# Patient Record
Sex: MALE | URBAN - METROPOLITAN AREA
[De-identification: names, ages, dates, MRNs, and addresses within clinical notes are randomized per-mention and may not be internally consistent; named-entity substitution may affect disease eponyms.]

---

## 2023-10-02 ENCOUNTER — APPOINTMENT (RX ONLY)
Dept: URBAN - METROPOLITAN AREA CLINIC 81 | Facility: CLINIC | Age: 48
Setting detail: DERMATOLOGY
End: 2023-10-02

## 2023-10-02 DIAGNOSIS — L91.8 OTHER HYPERTROPHIC DISORDERS OF THE SKIN: ICD-10-CM

## 2023-10-02 DIAGNOSIS — D18.0 HEMANGIOMA: ICD-10-CM

## 2023-10-02 DIAGNOSIS — L81.4 OTHER MELANIN HYPERPIGMENTATION: ICD-10-CM

## 2023-10-02 DIAGNOSIS — L259 CONTACT DERMATITIS AND OTHER ECZEMA, UNSPECIFIED CAUSE: ICD-10-CM

## 2023-10-02 DIAGNOSIS — L57.8 OTHER SKIN CHANGES DUE TO CHRONIC EXPOSURE TO NONIONIZING RADIATION: ICD-10-CM

## 2023-10-02 DIAGNOSIS — D22 MELANOCYTIC NEVI: ICD-10-CM

## 2023-10-02 DIAGNOSIS — Z71.89 OTHER SPECIFIED COUNSELING: ICD-10-CM

## 2023-10-02 DIAGNOSIS — L82.1 OTHER SEBORRHEIC KERATOSIS: ICD-10-CM

## 2023-10-02 PROBLEM — D23.72 OTHER BENIGN NEOPLASM OF SKIN OF LEFT LOWER LIMB, INCLUDING HIP: Status: ACTIVE | Noted: 2023-10-02

## 2023-10-02 PROBLEM — D18.01 HEMANGIOMA OF SKIN AND SUBCUTANEOUS TISSUE: Status: ACTIVE | Noted: 2023-10-02

## 2023-10-02 PROBLEM — D29.4 BENIGN NEOPLASM OF SCROTUM: Status: ACTIVE | Noted: 2023-10-02

## 2023-10-02 PROBLEM — L30.8 OTHER SPECIFIED DERMATITIS: Status: ACTIVE | Noted: 2023-10-02

## 2023-10-02 PROBLEM — D22.5 MELANOCYTIC NEVI OF TRUNK: Status: ACTIVE | Noted: 2023-10-02

## 2023-10-02 PROCEDURE — 99204 OFFICE O/P NEW MOD 45 MIN: CPT | Mod: 25

## 2023-10-02 PROCEDURE — ? PRESCRIPTION

## 2023-10-02 PROCEDURE — ? TREATMENT REGIMEN

## 2023-10-02 PROCEDURE — 17110 DESTRUCTION B9 LES UP TO 14: CPT

## 2023-10-02 PROCEDURE — ? COUNSELING

## 2023-10-02 PROCEDURE — ? FULL BODY SKIN EXAM

## 2023-10-02 PROCEDURE — ? BENIGN DESTRUCTION

## 2023-10-02 RX ORDER — MOMETASONE FUROATE 1 MG/G
CREAM TOPICAL
Qty: 45 | Refills: 3 | Status: ERX | COMMUNITY
Start: 2023-10-02

## 2023-10-02 RX ADMIN — MOMETASONE FUROATE: 1 CREAM TOPICAL at 00:00

## 2023-10-02 ASSESSMENT — LOCATION ZONE DERM
LOCATION ZONE: AXILLAE
LOCATION ZONE: TRUNK

## 2023-10-02 ASSESSMENT — LOCATION SIMPLE DESCRIPTION DERM
LOCATION SIMPLE: LEFT AXILLARY VAULT
LOCATION SIMPLE: CHEST
LOCATION SIMPLE: ABDOMEN

## 2023-10-02 ASSESSMENT — LOCATION DETAILED DESCRIPTION DERM
LOCATION DETAILED: EPIGASTRIC SKIN
LOCATION DETAILED: LEFT AXILLARY VAULT
LOCATION DETAILED: SUBXIPHOID
LOCATION DETAILED: RIGHT MEDIAL SUPERIOR CHEST
LOCATION DETAILED: PERIUMBILICAL SKIN

## 2023-10-02 ASSESSMENT — ITCH NUMERIC RATING SCALE: HOW SEVERE IS YOUR ITCHING?: 7

## 2023-10-02 NOTE — PROCEDURE: BENIGN DESTRUCTION
Medical Necessity Information: It is in your best interest to select a reason for this procedure from the list below. All of these items fulfill various CMS LCD requirements except the new and changing color options.
Post-Care Instructions: I reviewed with the patient in detail post-care instructions. Patient is to wear sunprotection, and avoid picking at any of the treated lesions. Pt may apply Vaseline to crusted or scabbing areas.
Consent: The patient's consent was obtained including but not limited to risks of crusting, scabbing, blistering, scarring, darker or lighter pigmentary change, recurrence, incomplete removal and infection.
Medical Necessity Clause: This procedure was medically necessary because the lesions that were treated were:
Add 52 Modifier (Optional): no
Detail Level: Detailed
Treatment Number (Will Not Render If 0): 0
Anesthesia Type: 1% lidocaine with 1:200,000 epinephrine
Anesthesia Volume In Cc: 0.3

## 2024-03-26 NOTE — PROCEDURE: REASSURANCE
bilateral upper extremity ROM was WFL (within functional limits)/bilateral lower extremity ROM was WFL (within functional limits)
Hide Additional Notes?: No
Detail Level: Detailed